# Patient Record
Sex: FEMALE | Race: WHITE | NOT HISPANIC OR LATINO
[De-identification: names, ages, dates, MRNs, and addresses within clinical notes are randomized per-mention and may not be internally consistent; named-entity substitution may affect disease eponyms.]

---

## 2020-03-26 PROBLEM — Z00.00 ENCOUNTER FOR PREVENTIVE HEALTH EXAMINATION: Status: ACTIVE | Noted: 2020-03-26

## 2020-03-27 ENCOUNTER — APPOINTMENT (OUTPATIENT)
Dept: BREAST CENTER | Facility: CLINIC | Age: 65
End: 2020-03-27
Payer: COMMERCIAL

## 2020-03-27 ENCOUNTER — APPOINTMENT (OUTPATIENT)
Dept: BREAST CENTER | Facility: CLINIC | Age: 65
End: 2020-03-27

## 2020-03-27 VITALS — WEIGHT: 143 LBS | BODY MASS INDEX: 24.41 KG/M2 | HEIGHT: 64 IN

## 2020-03-27 DIAGNOSIS — Z82.49 FAMILY HISTORY OF ISCHEMIC HEART DISEASE AND OTHER DISEASES OF THE CIRCULATORY SYSTEM: ICD-10-CM

## 2020-03-27 DIAGNOSIS — Z80.0 FAMILY HISTORY OF MALIGNANT NEOPLASM OF DIGESTIVE ORGANS: ICD-10-CM

## 2020-03-27 PROCEDURE — XXXXX: CPT

## 2020-03-27 RX ORDER — ALENDRONATE SODIUM 70 MG/1
70 TABLET ORAL
Refills: 0 | Status: ACTIVE | COMMUNITY

## 2020-03-27 RX ORDER — FLUTICASONE PROPIONATE 50 MCG
SPRAY, SUSPENSION NASAL
Refills: 0 | Status: ACTIVE | COMMUNITY

## 2020-04-14 ENCOUNTER — APPOINTMENT (OUTPATIENT)
Dept: BREAST CENTER | Facility: CLINIC | Age: 65
End: 2020-04-14
Payer: COMMERCIAL

## 2020-04-14 VITALS
HEART RATE: 72 BPM | SYSTOLIC BLOOD PRESSURE: 136 MMHG | DIASTOLIC BLOOD PRESSURE: 76 MMHG | HEIGHT: 64 IN | BODY MASS INDEX: 24.75 KG/M2 | WEIGHT: 145 LBS

## 2020-04-14 DIAGNOSIS — Z80.41 FAMILY HISTORY OF MALIGNANT NEOPLASM OF OVARY: ICD-10-CM

## 2020-04-14 DIAGNOSIS — Z78.9 OTHER SPECIFIED HEALTH STATUS: ICD-10-CM

## 2020-04-14 DIAGNOSIS — Z80.1 FAMILY HISTORY OF MALIGNANT NEOPLASM OF TRACHEA, BRONCHUS AND LUNG: ICD-10-CM

## 2020-04-14 DIAGNOSIS — Z87.2 PERSONAL HISTORY OF DISEASES OF THE SKIN AND SUBCUTANEOUS TISSUE: ICD-10-CM

## 2020-04-14 DIAGNOSIS — J30.2 OTHER SEASONAL ALLERGIC RHINITIS: ICD-10-CM

## 2020-04-14 PROCEDURE — 99205 OFFICE O/P NEW HI 60 MIN: CPT

## 2020-04-14 RX ORDER — CHOLECALCIFEROL (VITAMIN D3) 25 MCG
TABLET ORAL
Refills: 0 | Status: ACTIVE | COMMUNITY

## 2020-04-14 RX ORDER — FEXOFENADINE HCL 60 MG
CAPSULE ORAL
Refills: 0 | Status: ACTIVE | COMMUNITY

## 2020-04-14 RX ORDER — ASCORBIC ACID 500 MG
TABLET ORAL
Refills: 0 | Status: ACTIVE | COMMUNITY

## 2020-04-14 RX ORDER — PNV NO.95/FERROUS FUM/FOLIC AC 28MG-0.8MG
TABLET ORAL
Refills: 0 | Status: ACTIVE | COMMUNITY

## 2020-04-14 RX ORDER — CETIRIZINE HCL 10 MG
TABLET ORAL
Refills: 0 | Status: ACTIVE | COMMUNITY

## 2020-04-14 RX ORDER — OXYCODONE AND ACETAMINOPHEN 5; 325 MG/1; MG/1
5-325 TABLET ORAL
Refills: 0 | Status: ACTIVE | COMMUNITY

## 2020-04-14 RX ORDER — OLOPATADINE HCL 1 MG/ML
0.1 SOLUTION/ DROPS OPHTHALMIC
Refills: 0 | Status: ACTIVE | COMMUNITY

## 2020-04-14 NOTE — CONSULT LETTER
[Dear  ___] : Dear  [unfilled], [( Thank you for referring [unfilled] for consultation for _____ )] : Thank you for referring [unfilled] for consultation for [unfilled] [Please see my note below.] : Please see my note below. [Sincerely,] : Sincerely, [FreeTextEntry3] : Sepideh Blas MS DO\par Breast Surgeon\par [facility] \[place] [address]\par  [DrIman  ___] : Dr. ROCA

## 2020-04-14 NOTE — PHYSICAL EXAM
[Normocephalic] : normocephalic [Atraumatic] : atraumatic [Supple] : supple [No Supraclavicular Adenopathy] : no supraclavicular adenopathy [Examined in the supine and seated position] : examined in the supine and seated position [Symmetrical] : symmetrical [No dominant masses] : no dominant masses in right breast  [No dominant masses] : no dominant masses left breast [No Nipple Retraction] : no left nipple retraction [No Nipple Discharge] : no left nipple discharge [No Axillary Lymphadenopathy] : no left axillary lymphadenopathy [No Edema] : no edema [No Rashes] : no rashes [No Ulceration] : no ulceration [de-identified] : healing bx site inner quadrant

## 2020-04-14 NOTE — HISTORY OF PRESENT ILLNESS
[FreeTextEntry1] : This is a 64 year old female referred by Dr. Hoover for left breast ADH. She recently went through a difficult divorce but is now better. This was her first biopsy.  On screening breast imaging 3/12/2020 a lobulated asymmetry in inner left breast was seen, biopsy on 3/20/2020 showed (Evan clip) florid ADH with ER, [place] +. I discussed with pathology why markers were done and it was stated to expedite time if DCIS was returned. There was NO DCIS seen on the core. She had negative breast MRI and presents for consult.\par \par She does SBE. \par She has not noticed a change in her breast or a breast lump.\par She has not noticed a change in her nipple or nipple area.\par She has not noticed a change in the skin of the breast.\par She is not experiencing nipple discharge.\par She is not experiencing breast pain.\par She has not noticed a lump or lymph node under the armpit. \par \par BREAST CANCER RISK FACTORS\par Menarche: 11\par Date of LMP: age 50\par Menopause: post\par Grav:  6    Para: 4\par Age at first live birth: 31\par Nursed: yes all 4 children\par Hysterectomy: no\par Oophorectomy: no\par OCP: yes in the past over 40 years ago\par HRT: no\par Last pap/pelvic exam: 7/2019 WNL\par Related family history: sister stage 0 ductal carcinoma age 66 \par Ashkenazi: no\par Mastery risk assessment: BRCAPRO 9.4%, TCv6 34.9%, TCv7 30.7%, Anne Marie 28.8%, Dominic 4.3%\par BRCA testing: no, she will ask her sister\par Bra size: 36/38C\par \par Last mammogram: 3/12/2020 (left) bilat 8/30/2019      Location: [place] \par Report reviewed.                                                         Images reviewed.\par Results: BIRADS 4 suspicious \par breast is composed of scattered fibroglandular tissue. Stable masses are present in the upper outer left breast, 6cm from the nipple, there is a circumscribed 4mm mass. The lobulated asymmetry in the left inner breast, 5cm from the nipple there is a 5 mm, appearing more prominent on the current study. \par \par Last ultrasound:  3/12/2020 (left)           Location: [place] \par Report reviewed.                                 Images reviewed. \par Results:BIRADS 2\par in the left 7:00 axis, 5 cm from the nipple, there is a stable cyst measuring 0.3 x 0.3 x 0.2 cm. the the left 10:00 axis, 5 cm from the nipple, there is a 0.3 x 0.4 x 0.2 cm hypoechoic mass which is stable compared with the previous study.\par \par Last MRI:  4/6/2020                              Location: [place]\par Report reviewed.\par Results: BIRADS 2\par mild background enhancement. breasts are composed of fibroglandular tissue. Susceptibility artifact is present in the left 9:00 axis at the site of prior biopsy. postbiopsy change is seen throughout the inner breast. In the right upper outer quadrant, posterior third of the breast, there are 2 lobulated enhancing masses that are both high signal intensity on the T2-weighted images, corresponding with mammographically stable masses in the upper outer right breast likely representing intramammary lymph nodes. there is no suspicious enhancing mass or suspicious area of abnormal enhancement present. no enlarged axillary or internal mammary lymph nodes present. \par

## 2020-04-14 NOTE — ASSESSMENT
[FreeTextEntry1] : This is a 63yo female with recently diagnosed left breast atypical ductal hyperplasia. I reviewed her risk status. I reviewed our high risk screening program and her imaging options. I explained how breast MRI differs from mammogram and ultrasound. We reviewed medical oncology consult for risk reduction as well. I reviewed her MRI findings and COVID 19 precautions. Plan will be for surgical excision left breast ADH then risk reduction cs with Dr. Woods.\par reviewed genetic testing options but she would like to discuss with her sister first\par Plan mg/sono 3/2021 and eventual MRI to 6 month interval\par We reviewed risk reduction strategies including maintaining a BMI <25, limiting red meat intake and alcoholic beverages to 3 per week and exercise (150 min/ week low intensity or 75 min/week high intensity). And maintaining a normal vitamin D level.\par Educational handout provided.\par discussed risk reducing mastectomy as an option, but she is not interested at this time. We also reviewed the procedure of localization and excision. We reviewed postop care and recovery. We reviewed the risks of infection, bleeding, hematoma, seroma, deformity, scarring, and change of sensation, particularly in the nipple, with possible loss of sensation. She understands all of the above and her questions have been answered. She wants to go ahead with the surgery, and will see her PCP for medical clearance. \par \par She knows to call or return sooner should any concerns or questions arise.\par \par \par

## 2020-04-15 ENCOUNTER — TRANSCRIPTION ENCOUNTER (OUTPATIENT)
Age: 65
End: 2020-04-15

## 2020-06-11 ENCOUNTER — APPOINTMENT (OUTPATIENT)
Dept: BREAST CENTER | Facility: HOSPITAL | Age: 65
End: 2020-06-11
Payer: COMMERCIAL

## 2020-06-11 PROCEDURE — 19125 EXCISION BREAST LESION: CPT | Mod: LT,59

## 2020-06-11 PROCEDURE — 76098 X-RAY EXAM SURGICAL SPECIMEN: CPT | Mod: 26

## 2020-06-11 PROCEDURE — 14001 TIS TRNFR TRUNK 10.1-30SQCM: CPT

## 2020-06-19 ENCOUNTER — APPOINTMENT (OUTPATIENT)
Dept: BREAST CENTER | Facility: CLINIC | Age: 65
End: 2020-06-19
Payer: COMMERCIAL

## 2020-06-19 PROCEDURE — 99024 POSTOP FOLLOW-UP VISIT: CPT

## 2020-06-19 NOTE — ASSESSMENT
[FreeTextEntry1] : doing well\par path reviewed copy given\par med/onc cs Dr. Rivera\par f/u 2 month\par plan mg/sono 8/2020\par She knows to call or return sooner should any concerns or questions arise.\par

## 2020-06-19 NOTE — HISTORY OF PRESENT ILLNESS
[FreeTextEntry1] : This is a 64 year old female referred by Dr. Hoover for left breast ADH. She recently went through a difficult divorce but is now better. This was her first biopsy.  On screening breast imaging 3/12/2020 a lobulated asymmetry in inner left breast was seen, biopsy on 3/20/2020 showed (Evan clip) florid ADH with ER, [place] +. I discussed with pathology why markers were done and it was stated to expedite time if DCIS was returned. There was NO DCIS seen on the core. \par She is s/p left 9:00 Ebbx 6/11/2020 pathology showed benign breast tissue, UDH, healed prior bx site. She has no post op complaints.\par \par She does SBE. \par She has not noticed a change in her breast or a breast lump.\par She has not noticed a change in her nipple or nipple area.\par She has not noticed a change in the skin of the breast.\par She is not experiencing nipple discharge.\par She is not experiencing breast pain.\par She has not noticed a lump or lymph node under the armpit. \par \par BREAST CANCER RISK FACTORS\par Menarche: 11\par Date of LMP: age 50\par Menopause: post\par Grav:  6    Para: 4\par Age at first live birth: 31\par Nursed: yes all 4 children\par Hysterectomy: no\par Oophorectomy: no\par OCP: yes in the past over 40 years ago\par HRT: no\par Last pap/pelvic exam: 7/2019 WNL\par Related family history: sister stage 0 ductal carcinoma age 66 \par Ashkenazi: no\par Mastery risk assessment: BRCAPRO 9.4%, TCv6 34.9%, TCv7 30.7%, Anne Marie 28.8%, Dominic 4.3%\par BRCA testing: no, she will ask her sister\par Bra size: 36/38C\par \par Last mammogram: 3/12/2020 (left) bilat 8/30/2019      Location: [place] \par Report reviewed.                                                         Images reviewed.\par Results: BIRADS 4 suspicious \par breast is composed of scattered fibroglandular tissue. Stable masses are present in the upper outer left breast, 6cm from the nipple, there is a circumscribed 4mm mass. The lobulated asymmetry in the left inner breast, 5cm from the nipple there is a 5 mm, appearing more prominent on the current study. \par \par Last ultrasound:  3/12/2020 (left)           Location: [place] \par Report reviewed.                                 Images reviewed. \par Results:BIRADS 2\par in the left 7:00 axis, 5 cm from the nipple, there is a stable cyst measuring 0.3 x 0.3 x 0.2 cm. the the left 10:00 axis, 5 cm from the nipple, there is a 0.3 x 0.4 x 0.2 cm hypoechoic mass which is stable compared with the previous study.\par \par Last MRI:  4/6/2020                              Location: [place]\par Report reviewed.\par Results: BIRADS 2\par mild background enhancement. breasts are composed of fibroglandular tissue. Susceptibility artifact is present in the left 9:00 axis at the site of prior biopsy. postbiopsy change is seen throughout the inner breast. In the right upper outer quadrant, posterior third of the breast, there are 2 lobulated enhancing masses that are both high signal intensity on the T2-weighted images, corresponding with mammographically stable masses in the upper outer right breast likely representing intramammary lymph nodes. there is no suspicious enhancing mass or suspicious area of abnormal enhancement present. no enlarged axillary or internal mammary lymph nodes present. \par

## 2020-07-02 ENCOUNTER — APPOINTMENT (OUTPATIENT)
Dept: HEMATOLOGY ONCOLOGY | Facility: CLINIC | Age: 65
End: 2020-07-02
Payer: COMMERCIAL

## 2020-07-02 VITALS
SYSTOLIC BLOOD PRESSURE: 128 MMHG | WEIGHT: 146.06 LBS | HEIGHT: 64 IN | BODY MASS INDEX: 24.94 KG/M2 | DIASTOLIC BLOOD PRESSURE: 85 MMHG | OXYGEN SATURATION: 96 % | TEMPERATURE: 98.6 F | RESPIRATION RATE: 18 BRPM | HEART RATE: 75 BPM

## 2020-07-02 DIAGNOSIS — Z82.49 FAMILY HISTORY OF ISCHEMIC HEART DISEASE AND OTHER DISEASES OF THE CIRCULATORY SYSTEM: ICD-10-CM

## 2020-07-02 PROCEDURE — 99205 OFFICE O/P NEW HI 60 MIN: CPT

## 2020-07-02 NOTE — PHYSICAL EXAM
[Restricted in physically strenuous activity but ambulatory and able to carry out work of a light or sedentary nature] : Status 1- Restricted in physically strenuous activity but ambulatory and able to carry out work of a light or sedentary nature, e.g., light house work, office work [Normal] : affect appropriate [de-identified] : lumpectomy left breast , no masses no nodules no adenoapthy

## 2020-07-02 NOTE — ASSESSMENT
[FreeTextEntry1] : This is a 63 y/o post menopausal woman with a new diagnosis of ADH of left breast and family hx of breast cancer \par \par 1) high risk breast cancer \par reviewd pathology of ADH  and family hx ,  increased risk for development of breast cancer in future \par reviewed the use of chemoprevention to reduce the risk of breast cancer \par explained the data for the use of tamoxifen and risk reduction but increased side effects of uterine cancer and increased risk of dvt and pe and stroke \par also discussed raloxifene but not as good as tamoxifen to reduce risk \par then discussed more recent data for ai's \par reviewed the mechanism and efficacy to decrease risk by about 30- 50%  \par disucssed side effects of joint pains hot flashes vaginal dryness and osteopenia \par proceed with anastrozole \par \par 2) vit d \par check vit d level \par \par 3) osteopenia \par cont fosamax \par dexa every2 years \par cont vit d and exercise \par \par 4) family hx \par genetic testing discussed today \par reviewed possible outcomes \par \par \par follow up in 6 weeks

## 2020-07-02 NOTE — PHYSICAL EXAM
[Restricted in physically strenuous activity but ambulatory and able to carry out work of a light or sedentary nature] : Status 1- Restricted in physically strenuous activity but ambulatory and able to carry out work of a light or sedentary nature, e.g., light house work, office work [Normal] : affect appropriate [de-identified] : lumpectomy left breast , no masses no nodules no adenoapthy

## 2020-07-02 NOTE — ASSESSMENT
[FreeTextEntry1] : This is a 65 y/o post menopausal woman with a new diagnosis of ADH of left breast and family hx of breast cancer \par \par 1) high risk breast cancer \par reviewd pathology of ADH  and family hx ,  increased risk for development of breast cancer in future \par reviewed the use of chemoprevention to reduce the risk of breast cancer \par explained the data for the use of tamoxifen and risk reduction but increased side effects of uterine cancer and increased risk of dvt and pe and stroke \par also discussed raloxifene but not as good as tamoxifen to reduce risk \par then discussed more recent data for ai's \par reviewed the mechanism and efficacy to decrease risk by about 30- 50%  \par disucssed side effects of joint pains hot flashes vaginal dryness and osteopenia \par proceed with anastrozole \par \par 2) vit d \par check vit d level \par \par 3) osteopenia \par cont fosamax \par dexa every2 years \par cont vit d and exercise \par \par 4) family hx \par genetic testing discussed today \par reviewed possible outcomes \par \par \par follow up in 6 weeks

## 2020-07-02 NOTE — HISTORY OF PRESENT ILLNESS
[0 - No Distress] : Distress Level: 0 [de-identified] : THis is a 63 y/o woman who had a routine mammogram. THis was revealing for a assymetry.  She had a biopsy on mach 20, 2020 of the left inner breast , revealing for atypical ductal hyperplasia. NO evidence of DCIS or invasive cancer.  Patient has gone faithfully for mammograms and otherwise feeling well. \par SHe underwent excisional bx in june 2020 , this was negative for any invasive or noninvasive cancer \par \par Patient also has hx of osteopenia on fosamax and vit d \par \par  [de-identified] : Doing well , tolerated surgery well \par \par scheduled to do mammo in sept will  have repeat    \par dexa- ostepenia worse , started on fosamax \par idexa last year s increasing now , going to gym, now walking taking vit d  and calcium ( 1year ago ) \par watching the red meat \par didn’t do genetics yet \par still have ovaries \par no hormonal therapy

## 2020-07-02 NOTE — HISTORY OF PRESENT ILLNESS
[0 - No Distress] : Distress Level: 0 [de-identified] : THis is a 63 y/o woman who had a routine mammogram. THis was revealing for a assymetry.  She had a biopsy on mach 20, 2020 of the left inner breast , revealing for atypical ductal hyperplasia. NO evidence of DCIS or invasive cancer.  Patient has gone faithfully for mammograms and otherwise feeling well. \par SHe underwent excisional bx in june 2020 , this was negative for any invasive or noninvasive cancer \par \par Patient also has hx of osteopenia on fosamax and vit d \par \par  [de-identified] : Doing well , tolerated surgery well \par \par scheduled to do mammo in sept will  have repeat    \par dexa- ostepenia worse , started on fosamax \par idexa last year s increasing now , going to gym, now walking taking vit d  and calcium ( 1year ago ) \par watching the red meat \par didn’t do genetics yet \par still have ovaries \par no hormonal therapy

## 2020-08-11 ENCOUNTER — APPOINTMENT (OUTPATIENT)
Dept: HEMATOLOGY ONCOLOGY | Facility: CLINIC | Age: 65
End: 2020-08-11
Payer: COMMERCIAL

## 2020-08-11 VITALS
OXYGEN SATURATION: 96 % | BODY MASS INDEX: 24.24 KG/M2 | HEIGHT: 64 IN | TEMPERATURE: 97.9 F | WEIGHT: 142 LBS | HEART RATE: 66 BPM | SYSTOLIC BLOOD PRESSURE: 120 MMHG | DIASTOLIC BLOOD PRESSURE: 64 MMHG

## 2020-08-11 PROCEDURE — 99214 OFFICE O/P EST MOD 30 MIN: CPT

## 2020-08-11 NOTE — REASON FOR VISIT
[Follow-Up Visit] : a follow-up [FreeTextEntry2] : high risk for breast cancer , started antiestrogens here for tox check

## 2020-08-11 NOTE — HISTORY OF PRESENT ILLNESS
[0 - No Distress] : Distress Level: 0 [de-identified] : THis is a 65 y/o woman who had a routine mammogram. THis was revealing for a assymetry.  She had a biopsy on mach 20, 2020 of the left inner breast , revealing for atypical ductal hyperplasia. NO evidence of DCIS or invasive cancer.  Patient has gone faithfully for mammograms and otherwise feeling well. \par SHe underwent excisional bx in june 2020 , this was negative for any invasive or noninvasive cancer \par \par Patient also has hx of osteopenia on fosamax and vit d \par \par Doing well , tolerated surgery well \par \par 07/02/2020\par scheduled to do mammo in sept will  have repeat    \par dexa- ostepenia worse , started on fosamax \par idexa last year s increasing now , going to gym, now walking taking vit d  and calcium ( 1year ago ) \par watching the red meat \par didn’t do genetics yet \par still have ovaries \par no hormonal therapy  [de-identified] : Ms Lyn began taking anastole July 3rd. She has been tolerating it well. No joint pain, no stiffness.  No fatigue, hot flashes or other complaints.  She is feeling well overall. She has been exercising, walking out doors and doing chair yoga.\par \par Next mammogram and breast U/S is scheduled at [facility] September 15, 2020\par Follow-up with Dr Blas on September 15, 2020\par Bone Density due 2021\par Colonoscopy due in 2026\par Gyn (Dr Lawson) is scheduled for early September 2020.\par \par

## 2020-09-15 ENCOUNTER — APPOINTMENT (OUTPATIENT)
Dept: BREAST CENTER | Facility: CLINIC | Age: 65
End: 2020-09-15
Payer: COMMERCIAL

## 2020-09-15 VITALS
HEART RATE: 77 BPM | SYSTOLIC BLOOD PRESSURE: 112 MMHG | DIASTOLIC BLOOD PRESSURE: 71 MMHG | HEIGHT: 64 IN | WEIGHT: 142 LBS | BODY MASS INDEX: 24.24 KG/M2

## 2020-09-15 PROCEDURE — 99215 OFFICE O/P EST HI 40 MIN: CPT

## 2020-09-15 RX ORDER — ANASTROZOLE TABLETS 1 MG/1
1 TABLET ORAL DAILY
Qty: 90 | Refills: 3 | Status: DISCONTINUED | COMMUNITY
Start: 2020-07-02 | End: 2020-09-15

## 2020-09-15 NOTE — ASSESSMENT
[FreeTextEntry1] : 65 yo female with left ADH\par mg/sono today, report pending\par plan mg/sono OCT 2021 if negative\par plan MRI 4/2021\par cont letrozole\par

## 2020-09-15 NOTE — HISTORY OF PRESENT ILLNESS
[FreeTextEntry1] : This is a 64 year old female referred by Dr. Hoover for left breast ADH. She recently went through a difficult divorce but is now better. This was her first biopsy.  On screening breast imaging 3/12/2020 a lobulated asymmetry in inner left breast was seen, biopsy on 3/20/2020 showed (Evan clip) florid ADH with ER, [place] +. I discussed with pathology why markers were done and it was stated to expedite time if DCIS was returned. There was NO DCIS seen on the core. \par She is s/p left 9:00 Ebbx 6/11/2020 pathology showed benign breast tissue, UDH, healed prior bx site. She has no post op complaints.\par Recently switched from anastrozole to letrozole.\par \par She does SBE. \par She has not noticed a change in her breast or a breast lump.\par She has not noticed a change in her nipple or nipple area.\par She has not noticed a change in the skin of the breast.\par She is not experiencing nipple discharge.\par She is not experiencing breast pain.\par She has not noticed a lump or lymph node under the armpit. \par \par BREAST CANCER RISK FACTORS\par Menarche: 11\par Date of LMP: age 50\par Menopause: post\par Grav:  6    Para: 4\par Age at first live birth: 31\par Nursed: yes all 4 children\par Hysterectomy: no\par Oophorectomy: no\par OCP: yes in the past over 40 years ago\par HRT: no\par Last pap/pelvic exam: 9/9/2020 WNL\par Related family history: sister stage 0 ductal carcinoma age 66 \par Ashkenazi: no\par Mastery risk assessment: BRCAPRO 9.4%, TCv6 34.9%, TCv7 30.7%, Anne Marie 28.8%, Dominic 4.3%\par BRCA testing: no, she will ask her sister\par Bra size: 36/38C\par \par Last mammogram: 9/15/2020     Location: [place] \par Report reviewed.                                                         Images reviewed.\par Results: . \par \par Last ultrasound: 9/15/2020           Location: [place] \par Report reviewed.                                 Images reviewed. \par Results:\par \par \par Last MRI:  4/6/2020                              Location: [place]\par Report reviewed.\par Results: BIRADS 2\par mild background enhancement. breasts are composed of fibroglandular tissue. Susceptibility artifact is present in the left 9:00 axis at the site of prior biopsy. postbiopsy change is seen throughout the inner breast. In the right upper outer quadrant, posterior third of the breast, there are 2 lobulated enhancing masses that are both high signal intensity on the T2-weighted images, corresponding with mammographically stable masses in the upper outer right breast likely representing intramammary lymph nodes. there is no suspicious enhancing mass or suspicious area of abnormal enhancement present. no enlarged axillary or internal mammary lymph nodes present. \par

## 2020-09-15 NOTE — PHYSICAL EXAM
[Normocephalic] : normocephalic [Atraumatic] : atraumatic [Supple] : supple [No Supraclavicular Adenopathy] : no supraclavicular adenopathy [Examined in the supine and seated position] : examined in the supine and seated position [Symmetrical] : symmetrical [No dominant masses] : no dominant masses in right breast  [No dominant masses] : no dominant masses left breast [No Nipple Retraction] : no left nipple retraction [No Nipple Discharge] : no left nipple discharge [No Axillary Lymphadenopathy] : no left axillary lymphadenopathy [No Edema] : no edema [No Rashes] : no rashes [No Ulceration] : no ulceration [de-identified] : healed periareolar incision

## 2020-11-11 ENCOUNTER — APPOINTMENT (OUTPATIENT)
Dept: HEMATOLOGY ONCOLOGY | Facility: CLINIC | Age: 65
End: 2020-11-11
Payer: COMMERCIAL

## 2020-11-11 VITALS
TEMPERATURE: 97.9 F | WEIGHT: 151 LBS | HEART RATE: 78 BPM | BODY MASS INDEX: 25.92 KG/M2 | RESPIRATION RATE: 18 BRPM | SYSTOLIC BLOOD PRESSURE: 122 MMHG | OXYGEN SATURATION: 98 % | DIASTOLIC BLOOD PRESSURE: 80 MMHG

## 2020-11-11 PROCEDURE — 99214 OFFICE O/P EST MOD 30 MIN: CPT

## 2020-11-11 PROCEDURE — 99072 ADDL SUPL MATRL&STAF TM PHE: CPT

## 2020-11-11 RX ORDER — SERTRALINE HYDROCHLORIDE 25 MG/1
TABLET, FILM COATED ORAL
Refills: 0 | Status: DISCONTINUED | COMMUNITY
End: 2020-10-21

## 2020-11-11 RX ORDER — BUPROPION HYDROCHLORIDE 100 MG/1
TABLET, FILM COATED ORAL
Refills: 0 | Status: ACTIVE | COMMUNITY

## 2020-11-11 NOTE — ASSESSMENT
[FreeTextEntry1] : This is a 63 y/o post menopausal woman with a new diagnosis of ADH of left breast and family hx of breast cancer \par \par 1) high risk breast cancer \par reviewd pathology of ADH  and family hx ,  increased risk for development of breast cancer in future \par was on anastrozole but did not tolerate \par now on letrozole and tolerating well so far \par repeat markers \par mammogram up todate sept 2020 \par follow up dr moy \par cont letrazole \par \par 2) vit d \par cont vit d , level is excellent \par repeat today \par \par 3) osteopenia \par cont fosamax \par dexa every2 years \par cont vit d and exercise \par \par 4) family hx \par 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 \par patient is aware of indetermined sig but will inform her family and her other doctors \par \par 5) gyn and colonoscopy encouarged \par \par 6) anxieyt and deptression \par improved cont wellbutrin follow up with jannet \par \par follow up in 3 months , dw patient at length

## 2020-11-11 NOTE — HISTORY OF PRESENT ILLNESS
[0 - No Distress] : Distress Level: 0 [de-identified] : THis is a 65 y/o woman who had a routine mammogram. THis was revealing for a assymetry.  She had a biopsy on mach 20, 2020 of the left inner breast , revealing for atypical ductal hyperplasia. NO evidence of DCIS or invasive cancer.  Patient has gone faithfully for mammograms and otherwise feeling well. \par SHe underwent excisional bx in june 2020 , this was negative for any invasive or noninvasive cancer \par \par Patient also has hx of osteopenia on fosamax and vit d \par \par Doing well , tolerated surgery well \par \par 07/02/2020\par scheduled to do mammo in sept will  have repeat    \par dexa- ostepenia worse , started on fosamax \par idexa last year s increasing now , going to gym, now walking taking vit d  and calcium ( 1year ago ) \par watching the red meat \par didn’t do genetics yet \par still have ovaries \par no hormonal therapy  [de-identified] : 11/11/2020:\par Feeling well overall\par Gained 9 pound which she attributes to stress eating too many sweets, kids are in [place] \par Is on FMLA for work until January 14, 2021\par Was having hot flashes on anastrazole\par Began letrozole on Sept 8th, tolerating it well, no hot flashes or other complaints.\par No joint pain\par Continues to exercise at the gym 3-4 times weekly, and walks for exercise\par Began Wellbutrin 6 days ago\par \par Mammogram and breast u/s done ay [facility] on 09/15/2020, which showed no suspicious findings\par Continues on Fosamax\par Bone Density due 2021 - Dr Lawson, PCP will order for patient\par Breast MRI is due in April 2021, will do at [facility]\par Colonoscopy due in 2026\par Saw Gyn (Dr Lawson)in September 2020.\par \par

## 2020-11-11 NOTE — REASON FOR VISIT
[Follow-Up Visit] : a follow-up [FreeTextEntry2] : high risk for breast cancer , started antiestrogens here for tox check, on letrozole

## 2020-11-11 NOTE — PHYSICAL EXAM
[Restricted in physically strenuous activity but ambulatory and able to carry out work of a light or sedentary nature] : Status 1- Restricted in physically strenuous activity but ambulatory and able to carry out work of a light or sedentary nature, e.g., light house work, office work [Normal] : affect appropriate [de-identified] : no masses or adenopathy bilat , left breast dense

## 2021-02-19 NOTE — ASSESSMENT
[FreeTextEntry1] : This is a 65 y/o post menopausal woman with a new diagnosis of ADH of left breast and family hx of breast cancer \par \par 1) high risk breast cancer \par reviewd pathology of ADH  and family hx ,  increased risk for development of breast cancer in future \par started anastrozole in july 2020 \par tolerating very well so far \par mammo  up todate , follow up dr moy \par cont anastrozole \par \par 2) vit d \par cont vit d , level is excellent \par \par 3) osteopenia \par cont fosamax \par dexa every2 years \par cont vit d and exercise \par \par 4) family hx \par 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 \par patient is aware of indetermined sig but will inform her family and her other doctors \par patient has a copy of the invitae report \par dw patent at length \par reviewed possible outcomes \par \par \par follow up in 3 months 
No

## 2021-04-23 ENCOUNTER — NON-APPOINTMENT (OUTPATIENT)
Age: 66
End: 2021-04-23

## 2021-05-06 ENCOUNTER — NON-APPOINTMENT (OUTPATIENT)
Age: 66
End: 2021-05-06

## 2021-05-13 RX ORDER — LETROZOLE TABLETS 2.5 MG/1
2.5 TABLET, FILM COATED ORAL DAILY
Qty: 90 | Refills: 3 | Status: ACTIVE | COMMUNITY
Start: 2020-09-08 | End: 1900-01-01

## 2021-05-19 ENCOUNTER — APPOINTMENT (OUTPATIENT)
Dept: HEMATOLOGY ONCOLOGY | Facility: CLINIC | Age: 66
End: 2021-05-19
Payer: MEDICARE

## 2021-05-19 VITALS
HEART RATE: 87 BPM | DIASTOLIC BLOOD PRESSURE: 71 MMHG | SYSTOLIC BLOOD PRESSURE: 130 MMHG | TEMPERATURE: 98 F | HEIGHT: 64 IN | WEIGHT: 126 LBS | BODY MASS INDEX: 21.51 KG/M2 | OXYGEN SATURATION: 96 % | RESPIRATION RATE: 18 BRPM

## 2021-05-19 DIAGNOSIS — M85.80 OTHER SPECIFIED DISORDERS OF BONE DENSITY AND STRUCTURE, UNSPECIFIED SITE: ICD-10-CM

## 2021-05-19 DIAGNOSIS — E55.9 VITAMIN D DEFICIENCY, UNSPECIFIED: ICD-10-CM

## 2021-05-19 PROCEDURE — 99214 OFFICE O/P EST MOD 30 MIN: CPT

## 2021-05-19 PROCEDURE — 99072 ADDL SUPL MATRL&STAF TM PHE: CPT

## 2021-05-19 NOTE — PHYSICAL EXAM
[Restricted in physically strenuous activity but ambulatory and able to carry out work of a light or sedentary nature] : Status 1- Restricted in physically strenuous activity but ambulatory and able to carry out work of a light or sedentary nature, e.g., light house work, office work [Normal] : affect appropriate [de-identified] : no masses or adenopathy bilat , no skin lesions

## 2021-05-19 NOTE — REASON FOR VISIT
[Follow-Up Visit] : a follow-up [FreeTextEntry2] : high risk for breast cancer , on letrozole here for follow up

## 2021-05-19 NOTE — ASSESSMENT
[FreeTextEntry1] : This is a 63 y/o post menopausal woman with a new diagnosis of ADH of left breast and family hx of breast cancer \par \par 1) high risk breast cancer \par High risk due to history of of ADH  and family hx , on chemoprevention intolerant of anastrozole\par Continue letrozole, tolerating very well\par mammogram up todate sept 2020 repeat in 2021\par  mri of april 2021 negative for any evidence of disease\par follow up dr moy \par cont letrozole \par \par 2) vit d \par cont vit d , level is excellent \par repeat today \par \par 3) osteopenia \par cont fosamax \par dexa every2 years get most recent result, patient will be moving to [place] and will get her bone density done down there as she is due now\par cont vit d and exercise \par \par 4) family hx \par 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 \par Patient is up-to-date on colonoscopy she was encouraged to discuss with her GI whether or not more frequent monitoring is necessary\par patient is aware of indetermined sig but will inform her family and her other doctors \par \par 5) gyn needs to follow-up, will follow up in [place]\par colonosocopy in 5 years \par \par 6) anxiey and deptression \par improved cont wellbutrin follow up with jannet \par \par follow up in 6 months down to [place] with a new physician of course patient is welcome to stay with our practice if she stays in the area or decides to come back\par Discussed with patient at length

## 2021-05-19 NOTE — HISTORY OF PRESENT ILLNESS
[0 - No Distress] : Distress Level: 0 [de-identified] : THis is a 63 y/o woman who had a routine mammogram. THis was revealing for a assymetry.  She had a biopsy on mach 20, 2020 of the left inner breast , revealing for atypical ductal hyperplasia. NO evidence of DCIS or invasive cancer.  Patient has gone faithfully for mammograms and otherwise feeling well. \par SHe underwent excisional bx in june 2020 , this was negative for any invasive or noninvasive cancer \par \par Patient also has hx of osteopenia on fosamax and vit d \par \par Doing well , tolerated surgery well \par \par 07/02/2020\par scheduled to do mammo in sept will  have repeat    \par dexa- ostepenia worse , started on fosamax \par idexa last year s increasing now , going to gym, now walking taking vit d  and calcium ( 1year ago ) \par watching the red meat \par didn’t do genetics yet \par still have ovaries \par no hormonal therapy  [de-identified] : \par \par moving to [place], to [place]do \par fosamax  awilda well \par letrozole , tolerating well \par dexa due now \par sept mammo ultraosund  \par april 8, mri breast negative , will see her in 3 weeks \par sept gyn \par lost weight

## 2021-05-23 LAB
25(OH)D3 SERPL-MCNC: 53.7 NG/ML
CANCER AG15-3 SERPL-ACNC: 14.4 U/ML
CEA SERPL-MCNC: 1.2 NG/ML

## 2021-06-22 ENCOUNTER — APPOINTMENT (OUTPATIENT)
Dept: BREAST CENTER | Facility: CLINIC | Age: 66
End: 2021-06-22
Payer: MEDICARE

## 2021-06-22 VITALS
BODY MASS INDEX: 21.68 KG/M2 | DIASTOLIC BLOOD PRESSURE: 68 MMHG | HEART RATE: 81 BPM | WEIGHT: 127 LBS | HEIGHT: 64 IN | SYSTOLIC BLOOD PRESSURE: 108 MMHG

## 2021-06-22 DIAGNOSIS — N60.11 DIFFUSE CYSTIC MASTOPATHY OF RIGHT BREAST: ICD-10-CM

## 2021-06-22 DIAGNOSIS — Z80.3 FAMILY HISTORY OF MALIGNANT NEOPLASM OF BREAST: ICD-10-CM

## 2021-06-22 DIAGNOSIS — Z85.3 PERSONAL HISTORY OF MALIGNANT NEOPLASM OF BREAST: ICD-10-CM

## 2021-06-22 DIAGNOSIS — Z78.9 OTHER SPECIFIED HEALTH STATUS: ICD-10-CM

## 2021-06-22 DIAGNOSIS — Z12.39 ENCOUNTER FOR OTHER SCREENING FOR MALIGNANT NEOPLASM OF BREAST: ICD-10-CM

## 2021-06-22 DIAGNOSIS — Z12.31 ENCOUNTER FOR SCREENING MAMMOGRAM FOR MALIGNANT NEOPLASM OF BREAST: ICD-10-CM

## 2021-06-22 DIAGNOSIS — N60.92 UNSPECIFIED BENIGN MAMMARY DYSPLASIA OF LEFT BREAST: ICD-10-CM

## 2021-06-22 DIAGNOSIS — N60.12 DIFFUSE CYSTIC MASTOPATHY OF RIGHT BREAST: ICD-10-CM

## 2021-06-22 PROCEDURE — 99072 ADDL SUPL MATRL&STAF TM PHE: CPT

## 2021-06-22 PROCEDURE — 99214 OFFICE O/P EST MOD 30 MIN: CPT

## 2021-06-22 RX ORDER — SERTRALINE HYDROCHLORIDE 50 MG/1
50 TABLET, FILM COATED ORAL
Refills: 0 | Status: ACTIVE | COMMUNITY

## 2021-06-22 NOTE — ASSESSMENT
[FreeTextEntry1] : 64 yo female with left ADH\par \par plan mg/sono OCT 2021 if negative\par plan MRI 4/2022\par cont letrozole with dr. man\par We reviewed risk reduction strategies including maintaining a BMI <25, limiting red meat intake and alcoholic beverages to 3 per week and exercise (150 min/ week low intensity or 75 min/week high intensity). And maintaining a normal vitamin D level.\par \par She knows to call or return sooner should any concerns or questions arise.\par \par

## 2021-06-22 NOTE — HISTORY OF PRESENT ILLNESS
[FreeTextEntry1] : This is a 65 year old female referred by Dr. Hoover for left breast ADH. She recently went through a difficult divorce but is now better. This was her first biopsy.  On screening breast imaging 3/12/2020 a lobulated asymmetry in inner left breast was seen, biopsy on 3/20/2020 showed (Evan clip) florid ADH with ER, MI +. I discussed with pathology why markers were done and it was stated to expedite time if DCIS was returned. There was NO DCIS seen on the core. \par She is s/p left 9:00 Ebbx 6/11/2020 pathology showed benign breast tissue, UDH, healed prior bx site. She has no post op complaints.\par Recently switched from anastrozole to letrozole. She is moving to [place] this month.\par She completed [facility] left arm march 10, 2021.\par \par She does SBE. \par She has not noticed a change in her breast or a breast lump.\par She has not noticed a change in her nipple or nipple area.\par She has not noticed a change in the skin of the breast.\par She is not experiencing nipple discharge.\par She is not experiencing breast pain.\par She has not noticed a lump or lymph node under the armpit. \par \par BREAST CANCER RISK FACTORS\par Menarche: 11\par Date of LMP: age 50\par Menopause: post\par Grav:  6    Para: 4\par Age at first live birth: 31\par Nursed: yes all 4 children\par Hysterectomy: no\par Oophorectomy: no\par OCP: yes in the past over 40 years ago\par HRT: no\par Last pap/pelvic exam: 9/9/2020 WNL\par Related family history: sister stage 0 ductal carcinoma age 66 \par Ashkenazi: no\par Mastery risk assessment: BRCAPRO 9.4%, TCv6 34.9%, TCv7 30.7%, Anne Marie 28.8%, Dominic 4.3%\par BRCA testing: no, she will ask her sister\par Bra size: 36/38C\par \par Last mammogram: 9/15/2020     Location: [facility] \par Report reviewed.                      Images reviewed.\par Results: BIRADS 2\par Interval surgical excision in the left with new distortion in the 9:00 axis. \par \par Last ultrasound: 9/15/2020           Location: [facility] \par Report reviewed.                                 Images reviewed. \par Results: BIRADS 2\par No suspicious findings. \par \par Last MRI:  4/8/2021                              Location: [facility]\par Report reviewed.\par Results: BIRADS 2\par no MRI evidence of malignancy. \par

## 2021-06-22 NOTE — PHYSICAL EXAM
[Normocephalic] : normocephalic [Atraumatic] : atraumatic [Supple] : supple [No Supraclavicular Adenopathy] : no supraclavicular adenopathy [Examined in the supine and seated position] : examined in the supine and seated position [Symmetrical] : symmetrical [No dominant masses] : no dominant masses in right breast  [No dominant masses] : no dominant masses left breast [No Nipple Retraction] : no left nipple retraction [No Nipple Discharge] : no left nipple discharge [No Axillary Lymphadenopathy] : no left axillary lymphadenopathy [No Edema] : no edema [No Rashes] : no rashes [No Ulceration] : no ulceration [de-identified] : healed periareolar incision

## 2021-07-23 ENCOUNTER — TRANSCRIPTION ENCOUNTER (OUTPATIENT)
Age: 66
End: 2021-07-23